# Patient Record
Sex: FEMALE | Race: WHITE | Employment: OTHER | URBAN - METROPOLITAN AREA
[De-identification: names, ages, dates, MRNs, and addresses within clinical notes are randomized per-mention and may not be internally consistent; named-entity substitution may affect disease eponyms.]

---

## 2019-09-19 ENCOUNTER — NURSE TRIAGE (OUTPATIENT)
Dept: NURSING | Facility: CLINIC | Age: 79
End: 2019-09-19

## 2019-09-19 ENCOUNTER — HOSPITAL ENCOUNTER (EMERGENCY)
Facility: CLINIC | Age: 79
Discharge: HOME | End: 2019-09-20
Attending: EMERGENCY MEDICINE | Admitting: EMERGENCY MEDICINE
Payer: MEDICARE

## 2019-09-19 VITALS
DIASTOLIC BLOOD PRESSURE: 85 MMHG | HEIGHT: 64 IN | TEMPERATURE: 98.2 F | BODY MASS INDEX: 28.17 KG/M2 | RESPIRATION RATE: 18 BRPM | WEIGHT: 165 LBS | SYSTOLIC BLOOD PRESSURE: 165 MMHG | OXYGEN SATURATION: 94 %

## 2019-09-19 DIAGNOSIS — M54.5 LEFT LOW BACK PAIN, UNSPECIFIED CHRONICITY, WITH SCIATICA PRESENCE UNSPECIFIED: ICD-10-CM

## 2019-09-19 LAB
ALBUMIN UR-MCNC: NEGATIVE MG/DL
APPEARANCE UR: CLEAR
BILIRUB UR QL STRIP: NEGATIVE
COLOR UR AUTO: YELLOW
GLUCOSE UR STRIP-MCNC: >1000 MG/DL
HGB UR QL STRIP: NEGATIVE
KETONES UR STRIP-MCNC: NEGATIVE MG/DL
LEUKOCYTE ESTERASE UR QL STRIP: NEGATIVE
NITRATE UR QL: NEGATIVE
PH UR STRIP: 7 PH (ref 5–7)
RBC #/AREA URNS AUTO: 8 /HPF (ref 0–2)
SOURCE: ABNORMAL
SP GR UR STRIP: 1.01 (ref 1–1.03)
SQUAMOUS #/AREA URNS AUTO: <1 /HPF (ref 0–1)
UROBILINOGEN UR STRIP-MCNC: NORMAL MG/DL (ref 0–2)
WBC #/AREA URNS AUTO: <1 /HPF (ref 0–5)

## 2019-09-19 PROCEDURE — 25000132 ZZH RX MED GY IP 250 OP 250 PS 637: Mod: GY | Performed by: EMERGENCY MEDICINE

## 2019-09-19 PROCEDURE — 99283 EMERGENCY DEPT VISIT LOW MDM: CPT

## 2019-09-19 PROCEDURE — 25000131 ZZH RX MED GY IP 250 OP 636 PS 637: Mod: GY | Performed by: EMERGENCY MEDICINE

## 2019-09-19 PROCEDURE — 81001 URINALYSIS AUTO W/SCOPE: CPT | Performed by: EMERGENCY MEDICINE

## 2019-09-19 RX ORDER — DIPHENHYDRAMINE HCL 25 MG
25 CAPSULE ORAL ONCE
Status: COMPLETED | OUTPATIENT
Start: 2019-09-19 | End: 2019-09-20

## 2019-09-19 RX ORDER — IBUPROFEN 600 MG/1
600 TABLET, FILM COATED ORAL ONCE
Status: COMPLETED | OUTPATIENT
Start: 2019-09-19 | End: 2019-09-19

## 2019-09-19 RX ORDER — ACETAMINOPHEN 500 MG
500 TABLET ORAL ONCE
Status: COMPLETED | OUTPATIENT
Start: 2019-09-19 | End: 2019-09-19

## 2019-09-19 RX ORDER — PREDNISONE 20 MG/1
60 TABLET ORAL ONCE
Status: COMPLETED | OUTPATIENT
Start: 2019-09-19 | End: 2019-09-19

## 2019-09-19 RX ORDER — LIDOCAINE 4 G/G
1 PATCH TOPICAL ONCE
Status: DISCONTINUED | OUTPATIENT
Start: 2019-09-19 | End: 2019-09-20 | Stop reason: HOSPADM

## 2019-09-19 RX ADMIN — IBUPROFEN 600 MG: 600 TABLET ORAL at 23:37

## 2019-09-19 RX ADMIN — ACETAMINOPHEN 500 MG: 500 TABLET, FILM COATED ORAL at 23:37

## 2019-09-19 RX ADMIN — PREDNISONE 60 MG: 20 TABLET ORAL at 23:37

## 2019-09-19 ASSESSMENT — MIFFLIN-ST. JEOR: SCORE: 1218.44

## 2019-09-19 ASSESSMENT — ENCOUNTER SYMPTOMS
BACK PAIN: 1
ROS GI COMMENTS: NO INCONTINENCE
NUMBNESS: 1
HEADACHES: 1

## 2019-09-19 NOTE — ED AVS SNAPSHOT
Emergency Department  [address] [place] [id number]  Phone:  [phone]  Fax:  [phone]                                    Pari Patiño   MRN: [id number]    Department:   Emergency Department   Date of Visit:  9/19/2019           After Visit Summary Signature Page    I have received my discharge instructions, and my questions have been answered. I have discussed any challenges I see with this plan with the nurse or doctor.    ..........................................................................................................................................  Patient/Patient Representative Signature      ..........................................................................................................................................  Patient Representative Print Name and Relationship to Patient    ..................................................               ................................................  Date                                   Time    ..........................................................................................................................................  Reviewed by Signature/Title    ...................................................              ..............................................  Date                                               Time          22EPIC Rev 08/18

## 2019-09-20 PROCEDURE — 25000132 ZZH RX MED GY IP 250 OP 250 PS 637: Mod: GY | Performed by: EMERGENCY MEDICINE

## 2019-09-20 RX ORDER — METHYLPREDNISOLONE 4 MG
TABLET, DOSE PACK ORAL
Qty: 21 TABLET | Refills: 0 | Status: SHIPPED | OUTPATIENT
Start: 2019-09-20

## 2019-09-20 RX ORDER — LIDOCAINE 50 MG/G
1 PATCH TOPICAL EVERY 24 HOURS
Qty: 10 PATCH | Refills: 0 | Status: SHIPPED | OUTPATIENT
Start: 2019-09-20 | End: 2019-09-30

## 2019-09-20 RX ADMIN — LIDOCAINE 1 PATCH: 560 PATCH PERCUTANEOUS; TOPICAL; TRANSDERMAL at 00:53

## 2019-09-20 RX ADMIN — DIPHENHYDRAMINE HYDROCHLORIDE 25 MG: 25 CAPSULE ORAL at 00:53

## 2019-09-20 NOTE — ED PROVIDER NOTES
"  History     Chief Complaint:  Leg Pain and Back Pain    HPI   Pari Patiño is a 79 year old female with a history of hyperlipidemia, septic discitis of lumbar region, and chronic low back pain among others who presents to the emergency department today for evaluation of leg and back pain. Per chart review, the patient was evaluated by her primary care provider yesterday for back strain, at which time she was provided tylenol #3. She also underwent physical therapy, where she stated that she has been \"wracked with pain\" over the last few days, though she noted improvement of her pain with her advised exercises. Tonight the patient called the nurse after receiving epidural steroid injections at [facility] with pain to her left leg and groin ranked at an 8/10, new pain to her low back ranked at an 8/10, and swelling/numbness to the bottom of her left foot.     Here the patient reports that she received 2 epidural steroid injections at L3/L4 and L4/L5 around 1415 today, noting that this was done as a diagnosis tool after visualizing nerve root impingement on MRI. The patient explains that her anesthetic lasted approximately two hours, following which she developed a \"different\" left groin and left leg pain \"like a band,\" pain to her injection sites, and a one inch area of pain and swelling \"under the second and third toe.\" She states that her pains \"came with vengeance\" and have not resolved since onset despite use of her physical therapy exercises. The patient endorses utilizing tylenol #3, last at 2000, for symptom management. Here the patient states she believes she is developing a headache, which is common for her, as well as jaw stiffness. The patient denies any incontinence.     Allergies:  Aspirin  Trazodone   Ibuprofen   Sulfamethoxazole trimethoprim    Medications:    Albuterol sulfate  Fosamax  Symbicort  Fioricet  Pepcid   Zocor  Tylenol #3    Past Medical History:  " "  Concussion  Leukocytosis  Septic discitis of lumbar region  Low back pain  Hyperlipidemia   Gastroesophageal reflux disease   Raynaud's syndrome  Lichen sclerosus of female genitalia  Recurrent major depressive disorder  Elevated alkaline phosphatase level  Dermatitis of vulva  Cardiovascular disorder  Osteopenia  Osteoarthrosis  Onychomycosis    Open fracture of nasal bone  Hyponatremia   Delirium  Septic shock  Abdominal aortic embolism  Benign paroxysmal vertigo  Ocular motility disturbance  Post concussive syndrome  Solar elastosis    Past Surgical History:    Right knee surgery  Cataract extraction with IOL  Bladder repair  Repair rectal prolapse  Cholecystectomy  Breast biopsy   Hysterectomy vaginal     Family History:    Mother: heart disease  Brother: emphysema, colon twists  Daughter: thyroid disease  Father: colon cancer, emphysema, heart disease    Social History:  The patient was accompanied to the ED by her family.  Patient lives approximately 100 miles away and is staying in a hotel tonight.  PCP: Ernst Lomeli  Marital Status:        Review of Systems   HENT:        Jaw stiffness   Gastrointestinal:        No incontinence   Genitourinary:        No incontinence   Musculoskeletal: Positive for back pain.        Left leg pain, left groin pain, left foot pain   Neurological: Positive for numbness and headaches.   All other systems reviewed and are negative.      Physical Exam     Patient Vitals for the past 24 hrs:   BP Temp Temp src Heart Rate Resp SpO2 Height Weight   09/19/19 2205 (!) 165/85 98.2  F (36.8  C) Oral 77 18 94 % 1.626 m (5' 4\") 74.8 kg (165 lb)     Physical Exam  General: Appears well-developed and well-nourished.   Head: No signs of trauma.   CV: Normal rate and regular rhythm.    Resp: Effort normal and breath sounds normal. No respiratory distress.   GI: Soft. There is no tenderness.  No rebound or guarding.  Normal bowel sounds.  No CVA tenderness.  MSK: +tenderness to " left lower lumbar paraspinal region.  No midline tenderness.  Increased pain with left straight leg raise, but appropriate strength.  Normal range of motion. no edema. No Calf tenderness.  Neuro: The patient is alert and oriented.  Strength in upper/lower extremities normal and symmetrical.   Sensation normal. Speech normal.  Ambulatory.  GCS 15  Skin: Skin is warm and dry. No rash noted.   Psych: normal mood and affect. behavior is normal.       Emergency Department Course     Laboratory:  Laboratory findings were communicated with the patient who voiced understanding of the findings.    UA with Microscopic: GLC >1000 (H), RBC 8 (H), o/w WNL    Interventions:  2337 Ibuprofen 600 mg Oral  2337 Tylenol 500 mg Oral  2337 Deltasone 60 mg Oral  0053 Lidocaine 4% patch Transdermal  0053 Benadryl 25 mg Oral  0057 Reglan 10 mg IV    Emergency Department Course:    2213 Nursing notes and vitals reviewed.    2232 I performed an exam of the patient as documented above.     2340 The patient provided a urine sample here in the emergency department. This was sent for laboratory testing, findings above.    2350 Post void bladder scan demonstrated 0 mLs urine.     0009 Patient rechecked and updated.     0021 Patient's family updated.     Findings and plan explained to the Patient. Patient discharged home with instructions regarding supportive care, medications, and reasons to return. The importance of close follow-up was reviewed. The patient was prescribed Lidoderm patches and a medrol Dosepak.    Impression & Plan      Medical Decision Making:  Pari Patiño is a 79 year old female who presents to the emergency department today for evaluation of low back pain.  She has a history of chronic low back pain and has been doing physical therapy along with various medications for the pain.  Today she had steroid injections.  She reports for a period she was doing well but then the pain became worse, ultimately prompting her to come to  the ER.  On my evaluation, she was fully neurologically intact.  She did have some pain with doing a left straight leg raise, but her strength was intact and sensation was normal.  Patient was able to stand and ambulate on her own.  Clinically she does not have any signs of infection, and I would not be concerned for an abscess this soon after an injection.  She was given the above treatment with overall improvement of her symptoms.  She was able to get up from the bed on her own and ambulate without assistance.  In this setting, I feel that she is appropriate for discharge home.  She is recommended to continue with her physical therapy.  We also discussed optimizing her medication regimen, and she was given a prescription for medrol Dosepak along with Lidoderm patches.  She is instructed to return for any worsening symptoms or further concerns.    Diagnosis:    ICD-10-CM    1. Left low back pain, unspecified chronicity, with sciatica presence unspecified M54.5      Disposition:   The patient is discharged to home.    Discharge Medications:        START taking      Dose / Directions   lidocaine 5 % patch  Commonly known as:  LIDODERM      Dose:  1 patch  Place 1 patch onto the skin every 24 hours for 10 days  Quantity:  10 patch  Refills:  0     methylPREDNISolone 4 MG tablet therapy pack  Commonly known as:  MEDROL DOSEPAK      Follow Package Directions  Quantity:  21 tablet  Refills:  0           Where to get your medicines      Some of these will need a paper prescription and others can be bought over the counter. Ask your nurse if you have questions.    Bring a paper prescription for each of these medications    lidocaine 5 % patch    methylPREDNISolone 4 MG tablet therapy pack       Scribe Disclosure:  I, Jennifer Bergeron, am serving as a scribe at 10:30 PM on 9/19/2019 to document services personally performed by Víctor Oneil MD based on my observations and the provider's statements to me.     [facility]  [facility]  09/21/19 5520

## 2019-09-20 NOTE — ED TRIAGE NOTES
New back and leg pain after 2 Epidurals today at [facility]; L3, L4, L5.  Increased frequency today.  Right thigh pain patient describes feels like a tight band.

## 2019-09-20 NOTE — DISCHARGE INSTRUCTIONS
Medrol Dose pack as directed on package    Medications as needed  600 mg of ibuprofen every 6 hours  Tylenol #3 1 tablet every 6 hours  Tylenol 500 mg 1 tablet every 6 hours  Lidoderm patch to the back every 12 hours

## 2019-09-20 NOTE — TELEPHONE ENCOUNTER
Epidurals at [facility] today and now having new pain in left leg/groin area at 8/10. Also new pain in lower back 8/10.  New feeling in bottom of left foot like swollen numbness.  Will go to ER.    Krystal Castellon RN  [facility]      Reason for Disposition    [1] SEVERE back pain (e.g., excruciating) AND [2] sudden onset AND [3] age > 60    Additional Information    Negative: Looks like a broken bone or dislocated joint (e.g., crooked or deformed)    Negative: Sounds like a life-threatening emergency to the triager    Negative: Chest pain    Negative: Difficulty breathing    Negative: Entire foot is cool or blue in comparison to other side    Negative: Unable to walk    Negative: Passed out (i.e., lost consciousness, collapsed and was not responding)    Negative: Shock suspected (e.g., cold/pale/clammy skin, too weak to stand, low BP, rapid pulse)    Negative: Sounds like a life-threatening emergency to the triager    Protocols used: BACK PAIN-A-AH, LEG PAIN-A-AH

## 2019-10-02 ENCOUNTER — HEALTH MAINTENANCE LETTER (OUTPATIENT)
Age: 79
End: 2019-10-02

## 2019-12-16 ENCOUNTER — HEALTH MAINTENANCE LETTER (OUTPATIENT)
Age: 79
End: 2019-12-16

## 2021-01-15 ENCOUNTER — HEALTH MAINTENANCE LETTER (OUTPATIENT)
Age: 81
End: 2021-01-15

## 2021-09-04 ENCOUNTER — HEALTH MAINTENANCE LETTER (OUTPATIENT)
Age: 81
End: 2021-09-04

## 2022-02-19 ENCOUNTER — HEALTH MAINTENANCE LETTER (OUTPATIENT)
Age: 82
End: 2022-02-19

## 2022-10-22 ENCOUNTER — HEALTH MAINTENANCE LETTER (OUTPATIENT)
Age: 82
End: 2022-10-22

## 2023-04-01 ENCOUNTER — HEALTH MAINTENANCE LETTER (OUTPATIENT)
Age: 83
End: 2023-04-01